# Patient Record
Sex: FEMALE | Race: WHITE | Employment: FULL TIME | URBAN - NONMETROPOLITAN AREA
[De-identification: names, ages, dates, MRNs, and addresses within clinical notes are randomized per-mention and may not be internally consistent; named-entity substitution may affect disease eponyms.]

---

## 2023-08-15 ENCOUNTER — TELEPHONE (OUTPATIENT)
Dept: PSYCHIATRY | Age: 33
End: 2023-08-15

## 2023-08-15 NOTE — TELEPHONE ENCOUNTER
Called pt to schedule an appt from a referral    Scheduled with Ivan Rodriguez for 09/21/23 @ 2. Also, put on the cancellation list as well. Notified the referring provider as well.     Electronically signed by Freddie Conde MA on 8/15/2023 at 11:20 AM

## 2023-09-13 ENCOUNTER — TELEPHONE (OUTPATIENT)
Dept: PSYCHIATRY | Age: 33
End: 2023-09-13

## 2023-09-13 NOTE — TELEPHONE ENCOUNTER
Pt called to cancel/reschedule appt on 09/21/23 with Molina Oliva because pt is having surgery on the 19th and won't be able to drive for 2 weeks. Will call back to reschedule.     Electronically signed by Marilia Cuellar MA on 9/13/2023 at 10:12 AM